# Patient Record
Sex: MALE | Race: WHITE
[De-identification: names, ages, dates, MRNs, and addresses within clinical notes are randomized per-mention and may not be internally consistent; named-entity substitution may affect disease eponyms.]

---

## 2022-09-12 ENCOUNTER — RX ONLY (RX ONLY)
Age: 57
End: 2022-09-12

## 2022-09-12 ENCOUNTER — OFFICE (OUTPATIENT)
Dept: URBAN - METROPOLITAN AREA CLINIC 12 | Facility: CLINIC | Age: 57
Setting detail: OPHTHALMOLOGY
End: 2022-09-12
Payer: COMMERCIAL

## 2022-09-12 DIAGNOSIS — H00.022: ICD-10-CM

## 2022-09-12 DIAGNOSIS — L03.213: ICD-10-CM

## 2022-09-12 PROCEDURE — 92002 INTRM OPH EXAM NEW PATIENT: CPT | Performed by: OPHTHALMOLOGY

## 2022-09-12 ASSESSMENT — KERATOMETRY
OD_K2POWER_DIOPTERS: 43.50
OD_K1POWER_DIOPTERS: 43.25
OS_AXISANGLE_DEGREES: 057
OD_AXISANGLE_DEGREES: 110
OS_K1POWER_DIOPTERS: 43.25
OS_K2POWER_DIOPTERS: 43.50

## 2022-09-12 ASSESSMENT — AXIALLENGTH_DERIVED
OS_AL: 23.8849
OD_AL: 23.9349

## 2022-09-12 ASSESSMENT — REFRACTION_AUTOREFRACTION
OS_SPHERE: -0.25
OS_CYLINDER: -0.75
OD_AXIS: 043
OS_AXIS: 108
OD_CYLINDER: -0.50
OD_SPHERE: -0.50

## 2022-09-12 ASSESSMENT — REFRACTION_CURRENTRX
OD_OVR_VA: 20/
OS_OVR_VA: 20/

## 2022-09-12 ASSESSMENT — CONFRONTATIONAL VISUAL FIELD TEST (CVF)
OS_FINDINGS: FULL
OD_FINDINGS: FULL

## 2022-09-12 ASSESSMENT — SPHEQUIV_DERIVED
OS_SPHEQUIV: -0.625
OD_SPHEQUIV: -0.75

## 2022-09-12 ASSESSMENT — TONOMETRY
OS_IOP_MMHG: 17
OD_IOP_MMHG: 17

## 2022-09-12 ASSESSMENT — VISUAL ACUITY
OD_BCVA: 20/20
OS_BCVA: 20/20

## 2022-09-16 ENCOUNTER — RX ONLY (RX ONLY)
Age: 57
End: 2022-09-16

## 2022-09-16 ENCOUNTER — OFFICE (OUTPATIENT)
Dept: URBAN - METROPOLITAN AREA CLINIC 12 | Facility: CLINIC | Age: 57
Setting detail: OPHTHALMOLOGY
End: 2022-09-16
Payer: COMMERCIAL

## 2022-09-16 DIAGNOSIS — L03.213: ICD-10-CM

## 2022-09-16 DIAGNOSIS — H00.022: ICD-10-CM

## 2022-09-16 DIAGNOSIS — H43.393: ICD-10-CM

## 2022-09-16 PROBLEM — H52.7 REFRACTIVE ERROR: Status: ACTIVE | Noted: 2022-09-16

## 2022-09-16 PROCEDURE — 92014 COMPRE OPH EXAM EST PT 1/>: CPT | Performed by: OPHTHALMOLOGY

## 2022-09-16 ASSESSMENT — AXIALLENGTH_DERIVED
OS_AL: 23.9794
OS_AL: 23.9292
OD_AL: 23.9378

## 2022-09-16 ASSESSMENT — KERATOMETRY
OS_K2POWER_DIOPTERS: 43.25
OD_K1POWER_DIOPTERS: 43.25
OS_K1POWER_DIOPTERS: 43.00
OS_AXISANGLE_DEGREES: 038
OD_K2POWER_DIOPTERS: 43.75
OD_AXISANGLE_DEGREES: 100

## 2022-09-16 ASSESSMENT — REFRACTION_MANIFEST
OS_AXIS: 110
OD_VA1: 20/20-1
OS_CYLINDER: -0.25
OS_VA1: 20/20-1
OD_SPHERE: -0.75
OS_SPHERE: -0.50
OD_CYLINDER: SPHERE

## 2022-09-16 ASSESSMENT — REFRACTION_AUTOREFRACTION
OD_AXIS: 032
OS_AXIS: 112
OD_SPHERE: -0.75
OS_CYLINDER: -0.50
OD_CYLINDER: -0.25
OS_SPHERE: -0.25

## 2022-09-16 ASSESSMENT — VISUAL ACUITY
OD_BCVA: 20/30-2
OS_BCVA: 20/40+2

## 2022-09-16 ASSESSMENT — SPHEQUIV_DERIVED
OS_SPHEQUIV: -0.625
OD_SPHEQUIV: -0.875
OS_SPHEQUIV: -0.5

## 2022-09-16 ASSESSMENT — TONOMETRY
OS_IOP_MMHG: 21
OD_IOP_MMHG: 18

## 2022-09-16 ASSESSMENT — CONFRONTATIONAL VISUAL FIELD TEST (CVF)
OS_FINDINGS: FULL
OD_FINDINGS: FULL

## 2022-09-16 ASSESSMENT — REFRACTION_CURRENTRX
OD_OVR_VA: 20/
OS_OVR_VA: 20/

## 2023-10-05 ENCOUNTER — APPOINTMENT (OUTPATIENT)
Dept: ORTHOPEDIC SURGERY | Facility: CLINIC | Age: 58
End: 2023-10-05
Payer: COMMERCIAL

## 2023-10-05 VITALS — HEIGHT: 71 IN | WEIGHT: 245 LBS | BODY MASS INDEX: 34.3 KG/M2

## 2023-10-05 DIAGNOSIS — Z78.9 OTHER SPECIFIED HEALTH STATUS: ICD-10-CM

## 2023-10-05 PROBLEM — Z00.00 ENCOUNTER FOR PREVENTIVE HEALTH EXAMINATION: Status: ACTIVE | Noted: 2023-10-05

## 2023-10-05 PROCEDURE — 73030 X-RAY EXAM OF SHOULDER: CPT | Mod: RT

## 2023-10-05 PROCEDURE — 99204 OFFICE O/P NEW MOD 45 MIN: CPT | Mod: 25

## 2023-10-05 PROCEDURE — 20610 DRAIN/INJ JOINT/BURSA W/O US: CPT | Mod: RT

## 2023-10-05 RX ORDER — MELOXICAM 15 MG/1
15 TABLET ORAL
Qty: 30 | Refills: 2 | Status: ACTIVE | COMMUNITY
Start: 2023-10-05 | End: 1900-01-01

## 2023-11-16 ENCOUNTER — APPOINTMENT (OUTPATIENT)
Dept: ORTHOPEDIC SURGERY | Facility: CLINIC | Age: 58
End: 2023-11-16
Payer: COMMERCIAL

## 2023-11-16 VITALS — BODY MASS INDEX: 34.3 KG/M2 | HEIGHT: 71 IN | WEIGHT: 245 LBS

## 2023-11-16 PROCEDURE — 99213 OFFICE O/P EST LOW 20 MIN: CPT

## 2023-11-17 ENCOUNTER — RESULT REVIEW (OUTPATIENT)
Age: 58
End: 2023-11-17

## 2023-12-14 ENCOUNTER — APPOINTMENT (OUTPATIENT)
Dept: ORTHOPEDIC SURGERY | Facility: CLINIC | Age: 58
End: 2023-12-14
Payer: COMMERCIAL

## 2023-12-14 VITALS — BODY MASS INDEX: 34.3 KG/M2 | WEIGHT: 245 LBS | HEIGHT: 71 IN

## 2023-12-14 PROCEDURE — 99214 OFFICE O/P EST MOD 30 MIN: CPT

## 2023-12-14 NOTE — DISCUSSION/SUMMARY
[de-identified] : History, clinical examination and imaging were most consistent with: -right shoulder full thickness supraspinatus tear and high grade partial upper subscapularis tear, SLAP tear, proximal biceps tendonitis  The diagnosis was explained in detail. The potential non-surgical and surgical treatments were reviewed. The relative risks and benefits of each option were considered relative to the patients age, activity level, medical history, symptom severity and previously attempted treatments.   -Surgical treatment was selected. The patient failed to improve with non-surgical treatment. Their symptoms have placed a significant burden on their quality of life. The risks, benefits and alternatives of the planned surgical procedure were discussed, along with the expected timeline for rehabilitation and specifics regarding the most common complications. The patient agreed to participate in post-operative physical therapy.   -Procedure: right shoulder arthrocopic rotator cuff repair of supra and upper subscapularis, debridement, subacromial decompression, biceps tenodesis -Tentative date: january or february -Medical clearance: yes -Cardiac clearance: no -Diabetic: no -Smoker: no -Follow up: 1-2 weeks after surgery   (MDM)   Problem Complexity -Moderate: chronic illness with exacerbation   Risk -Moderate: pre-surgical discussion

## 2023-12-14 NOTE — HISTORY OF PRESENT ILLNESS
[de-identified] : 12/14: f/u for the rt shoulder to discuss MRI results. PT has helped with strength but still has pain at night.   11/16/23: pt presents for FUV of the right shoulder. CSI did not help much. Pain worsens with activity. has been attending PT without relief  Date of initial evaluation is 10/05/23 Patient age is 58 Occupation is   Body part causing symptoms is the RT shoulder Symptoms began 2 months ago, pt fell off his bed and landed on the involved shoulder  Location of pain is anterior and posterior Quality of pain is dull aching, sharp  Pain score at rest is 4-5 Pain score during activity is 9 Radicular symptoms are not present Prior treatments include none Patient's condition is not associated with workers compensation, no-fault or interscholastic athletics

## 2023-12-14 NOTE — IMAGING
[Right] : right shoulder [There are no fractures, subluxations or dislocations. No significant abnormalities are seen] : There are no fractures, subluxations or dislocations. No significant abnormalities are seen [Type 2 acromion] : Type 2 acromion [de-identified] : (Exam: Shoulder)   Laterality is right    Patient is in no acute distress, alert and oriented Sensation is grossly intact to light touch in the hand Motor function is 5/5 in the hand Capillary refill is less than 2 seconds in the fingers Lymphadenopathy is not present Peripheral edema is not present   Skin is intact Swelling is not present Atrophy is not present Scapular winging is not present Deformity of the AC joint is not present Deformity of the biceps is not present   Bicipital groove tenderness is present AC joint tenderness is not present Scapulothoracic tenderness is not present   Active forward elevation is 165 Passive forward elevation is 175 External rotation at the side is 80 Internal rotation behind the back is to the level of T12   Forward elevation strength is 5-/5 External rotation strength at the side is 5/5 Internal rotation strength at the side is 5/5 Deltoid strength of anterior, posterior and lateral heads is 5/5   Coleman test is abnormal OBriens test is abnormal Empty can test is abnormal Speeds test is abnormal Cross body adduction test is normal Belly press test is normal Apprehension and relocation is normal Sulcus sign is normal

## 2023-12-14 NOTE — DATA REVIEWED
[MRI] : MRI [Right] : of the right [Shoulder] : shoulder [Report was reviewed and noted in the chart] : The report was reviewed and noted in the chart [I reviewed the films/CD and agree] : I reviewed the films/CD and agree [FreeTextEntry1] : full thickness tear of the leading edge of the supraspinatus with high grade partial tearing of remaining supraspinatus, high grade partial upper border subscap tear, type 2 acromion, SLAP tear and proximal biceps tendonitis

## 2024-05-16 ENCOUNTER — APPOINTMENT (OUTPATIENT)
Dept: ORTHOPEDIC SURGERY | Facility: CLINIC | Age: 59
End: 2024-05-16
Payer: COMMERCIAL

## 2024-05-16 DIAGNOSIS — M75.121 COMPLETE ROTATOR CUFF TEAR OR RUPTURE OF RIGHT SHOULDER, NOT SPECIFIED AS TRAUMATIC: ICD-10-CM

## 2024-05-16 PROCEDURE — 99213 OFFICE O/P EST LOW 20 MIN: CPT

## 2024-05-16 NOTE — IMAGING
[Right] : right shoulder [There are no fractures, subluxations or dislocations. No significant abnormalities are seen] : There are no fractures, subluxations or dislocations. No significant abnormalities are seen [Type 2 acromion] : Type 2 acromion [de-identified] : (Exam: Shoulder)   Laterality is right    Patient is in no acute distress, alert and oriented Sensation is grossly intact to light touch in the hand Motor function is 5/5 in the hand Capillary refill is less than 2 seconds in the fingers Lymphadenopathy is not present Peripheral edema is not present   Skin is intact Swelling is not present Atrophy is not present Scapular winging is not present Deformity of the AC joint is not present Deformity of the biceps is not present   Bicipital groove tenderness is present AC joint tenderness is not present Scapulothoracic tenderness is not present   Active forward elevation is 165 Passive forward elevation is 175 External rotation at the side is 80 Internal rotation behind the back is to the level of T12   Forward elevation strength is 5-/5 External rotation strength at the side is 5/5 Internal rotation strength at the side is 5/5 Deltoid strength of anterior, posterior and lateral heads is 5/5   Coleman test is abnormal OBriens test is abnormal Empty can test is abnormal Speeds test is abnormal Cross body adduction test is normal Belly press test is normal Apprehension and relocation is normal Sulcus sign is normal

## 2024-05-16 NOTE — DISCUSSION/SUMMARY
[de-identified] : History, clinical examination and imaging were most consistent with: -right shoulder full thickness supraspinatus tear and high grade partial upper subscapularis tear, SLAP tear, proximal biceps tendonitis  The diagnosis was explained in detail. The potential non-surgical and surgical treatments were reviewed. The relative risks and benefits of each option were considered relative to the patients age, activity level, medical history, symptom severity and previously attempted treatments.   -Discussed that he is a candidate for right shoulder arthrocopic rotator cuff repair of supra and upper subscapularis, debridement, subacromial decompression, biceps tenodesis. -Patient wishes to delay surgery until the fall due to personal obligations. -Recommend to continue HEP in the meantime.  -Discussed risk of tear progression and atrophy over time. -Tylenol and motrin as needed for pain. -Follow up in 2 months to revisit surgical timing.   (MDM)   Problem Complexity -Moderate: chronic illness with exacerbation   Risk -Low: over the counter medication

## 2024-05-16 NOTE — HISTORY OF PRESENT ILLNESS
[de-identified] : 05/16/2024: f/u for right shoulder. pain is improved from last visit, still has some weakness with overhead movements. doing HEP 3x per week. he has elected to delay surgery due to work obligations.   12/14: f/u for the rt shoulder to discuss MRI results. PT has helped with strength but still has pain at night.   11/16/23: pt presents for FUV of the right shoulder. CSI did not help much. Pain worsens with activity. has been attending PT without relief  Date of initial evaluation is 10/05/23 Patient age is 58 Occupation is   Body part causing symptoms is the RT shoulder Symptoms began 2 months ago, pt fell off his bed and landed on the involved shoulder  Location of pain is anterior and posterior Quality of pain is dull aching, sharp  Pain score at rest is 4-5 Pain score during activity is 9 Radicular symptoms are not present Prior treatments include none Patient's condition is not associated with workers compensation, no-fault or interscholastic athletics

## 2024-07-25 ENCOUNTER — APPOINTMENT (OUTPATIENT)
Dept: ORTHOPEDIC SURGERY | Facility: CLINIC | Age: 59
End: 2024-07-25